# Patient Record
Sex: FEMALE | Race: WHITE | Employment: PART TIME | URBAN - METROPOLITAN AREA
[De-identification: names, ages, dates, MRNs, and addresses within clinical notes are randomized per-mention and may not be internally consistent; named-entity substitution may affect disease eponyms.]

---

## 2018-05-27 ENCOUNTER — APPOINTMENT (OUTPATIENT)
Dept: GENERAL RADIOLOGY | Age: 68
End: 2018-05-27
Attending: EMERGENCY MEDICINE
Payer: MEDICARE

## 2018-05-27 ENCOUNTER — HOSPITAL ENCOUNTER (EMERGENCY)
Age: 68
Discharge: HOME | End: 2018-05-27
Attending: EMERGENCY MEDICINE
Payer: MEDICARE

## 2018-05-27 VITALS
TEMPERATURE: 98 F | HEIGHT: 60 IN | SYSTOLIC BLOOD PRESSURE: 144 MMHG | WEIGHT: 176 LBS | BODY MASS INDEX: 34.55 KG/M2 | DIASTOLIC BLOOD PRESSURE: 84 MMHG | RESPIRATION RATE: 20 BRPM | OXYGEN SATURATION: 100 % | HEART RATE: 89 BPM

## 2018-05-27 DIAGNOSIS — E83.42 HYPOMAGNESEMIA: ICD-10-CM

## 2018-05-27 DIAGNOSIS — M79.604 BILATERAL LEG PAIN: Primary | ICD-10-CM

## 2018-05-27 DIAGNOSIS — M79.605 BILATERAL LEG PAIN: Primary | ICD-10-CM

## 2018-05-27 LAB
ALBUMIN SERPL-MCNC: 3.7 G/DL (ref 3.4–5)
ALBUMIN/GLOB SERPL: 1.2 {RATIO} (ref 0.8–1.7)
ALP SERPL-CCNC: 80 U/L (ref 45–117)
ALT SERPL-CCNC: 35 U/L (ref 13–56)
ANION GAP SERPL CALC-SCNC: 13 MMOL/L (ref 3–18)
APPEARANCE UR: CLEAR
AST SERPL-CCNC: 20 U/L (ref 15–37)
BACTERIA URNS QL MICRO: ABNORMAL /HPF
BASOPHILS # BLD: 0 K/UL (ref 0–0.06)
BASOPHILS NFR BLD: 0 % (ref 0–2)
BILIRUB SERPL-MCNC: 0.4 MG/DL (ref 0.2–1)
BILIRUB UR QL: NEGATIVE
BUN SERPL-MCNC: 32 MG/DL (ref 7–18)
BUN/CREAT SERPL: 22 (ref 12–20)
CALCIUM SERPL-MCNC: 8.8 MG/DL (ref 8.5–10.1)
CHLORIDE SERPL-SCNC: 104 MMOL/L (ref 100–108)
CK MB CFR SERPL CALC: 0.8 % (ref 0–4)
CK MB SERPL-MCNC: 2 NG/ML (ref 5–25)
CK SERPL-CCNC: 260 U/L (ref 26–192)
CO2 SERPL-SCNC: 23 MMOL/L (ref 21–32)
COLOR UR: YELLOW
CREAT SERPL-MCNC: 1.43 MG/DL (ref 0.6–1.3)
D DIMER PPP FEU-MCNC: 0.36 UG/ML(FEU)
DIFFERENTIAL METHOD BLD: ABNORMAL
EOSINOPHIL # BLD: 0.3 K/UL (ref 0–0.4)
EOSINOPHIL NFR BLD: 4 % (ref 0–5)
EPITH CASTS URNS QL MICRO: ABNORMAL /LPF (ref 0–5)
ERYTHROCYTE [DISTWIDTH] IN BLOOD BY AUTOMATED COUNT: 13.5 % (ref 11.6–14.5)
GLOBULIN SER CALC-MCNC: 3 G/DL (ref 2–4)
GLUCOSE SERPL-MCNC: 194 MG/DL (ref 74–99)
GLUCOSE UR STRIP.AUTO-MCNC: NEGATIVE MG/DL
HCT VFR BLD AUTO: 34.4 % (ref 35–45)
HGB BLD-MCNC: 11.5 G/DL (ref 12–16)
HGB UR QL STRIP: NEGATIVE
KETONES UR QL STRIP.AUTO: NEGATIVE MG/DL
LEUKOCYTE ESTERASE UR QL STRIP.AUTO: ABNORMAL
LYMPHOCYTES # BLD: 2.5 K/UL (ref 0.9–3.6)
LYMPHOCYTES NFR BLD: 33 % (ref 21–52)
MAGNESIUM SERPL-MCNC: 1 MG/DL (ref 1.6–2.6)
MCH RBC QN AUTO: 30.2 PG (ref 24–34)
MCHC RBC AUTO-ENTMCNC: 33.4 G/DL (ref 31–37)
MCV RBC AUTO: 90.3 FL (ref 74–97)
MONOCYTES # BLD: 0.4 K/UL (ref 0.05–1.2)
MONOCYTES NFR BLD: 5 % (ref 3–10)
NEUTS SEG # BLD: 4.4 K/UL (ref 1.8–8)
NEUTS SEG NFR BLD: 58 % (ref 40–73)
NITRITE UR QL STRIP.AUTO: NEGATIVE
PH UR STRIP: 5 [PH] (ref 5–8)
PLATELET # BLD AUTO: 220 K/UL (ref 135–420)
PMV BLD AUTO: 9.4 FL (ref 9.2–11.8)
POTASSIUM SERPL-SCNC: 3.8 MMOL/L (ref 3.5–5.5)
PROT SERPL-MCNC: 6.7 G/DL (ref 6.4–8.2)
PROT UR STRIP-MCNC: NEGATIVE MG/DL
RBC # BLD AUTO: 3.81 M/UL (ref 4.2–5.3)
RBC #/AREA URNS HPF: NEGATIVE /HPF (ref 0–5)
SODIUM SERPL-SCNC: 140 MMOL/L (ref 136–145)
SP GR UR REFRACTOMETRY: 1.02 (ref 1–1.03)
TROPONIN I SERPL-MCNC: <0.02 NG/ML (ref 0–0.06)
UROBILINOGEN UR QL STRIP.AUTO: 0.2 EU/DL (ref 0.2–1)
WBC # BLD AUTO: 7.5 K/UL (ref 4.6–13.2)
WBC URNS QL MICRO: ABNORMAL /HPF (ref 0–5)

## 2018-05-27 PROCEDURE — 82553 CREATINE MB FRACTION: CPT | Performed by: EMERGENCY MEDICINE

## 2018-05-27 PROCEDURE — 71045 X-RAY EXAM CHEST 1 VIEW: CPT

## 2018-05-27 PROCEDURE — 74011250636 HC RX REV CODE- 250/636: Performed by: EMERGENCY MEDICINE

## 2018-05-27 PROCEDURE — 93005 ELECTROCARDIOGRAM TRACING: CPT

## 2018-05-27 PROCEDURE — 93970 EXTREMITY STUDY: CPT

## 2018-05-27 PROCEDURE — 85379 FIBRIN DEGRADATION QUANT: CPT | Performed by: EMERGENCY MEDICINE

## 2018-05-27 PROCEDURE — 99285 EMERGENCY DEPT VISIT HI MDM: CPT

## 2018-05-27 PROCEDURE — 80053 COMPREHEN METABOLIC PANEL: CPT | Performed by: EMERGENCY MEDICINE

## 2018-05-27 PROCEDURE — 96365 THER/PROPH/DIAG IV INF INIT: CPT

## 2018-05-27 PROCEDURE — 81001 URINALYSIS AUTO W/SCOPE: CPT | Performed by: EMERGENCY MEDICINE

## 2018-05-27 PROCEDURE — 83735 ASSAY OF MAGNESIUM: CPT | Performed by: EMERGENCY MEDICINE

## 2018-05-27 PROCEDURE — 85025 COMPLETE CBC W/AUTO DIFF WBC: CPT | Performed by: EMERGENCY MEDICINE

## 2018-05-27 PROCEDURE — 94762 N-INVAS EAR/PLS OXIMTRY CONT: CPT

## 2018-05-27 RX ORDER — MAGNESIUM SULFATE HEPTAHYDRATE 40 MG/ML
2 INJECTION, SOLUTION INTRAVENOUS ONCE
Status: COMPLETED | OUTPATIENT
Start: 2018-05-27 | End: 2018-05-27

## 2018-05-27 RX ORDER — LANOLIN ALCOHOL/MO/W.PET/CERES
400 CREAM (GRAM) TOPICAL 2 TIMES DAILY
Qty: 14 TAB | Refills: 0 | Status: SHIPPED | OUTPATIENT
Start: 2018-05-27

## 2018-05-27 RX ADMIN — MAGNESIUM SULFATE IN WATER 2 G: 40 INJECTION, SOLUTION INTRAVENOUS at 16:10

## 2018-05-27 RX ADMIN — SODIUM CHLORIDE 1000 ML: 900 INJECTION, SOLUTION INTRAVENOUS at 16:41

## 2018-05-27 NOTE — PROCEDURES
[facility]  *** FINAL REPORT ***    Name: Bhavana Carmen  MRN: [id number]  : 1950  HIS Order #: [id number]  [id number] [facility] Visit #: [id number]  Date: 27 May 2018    TYPE OF TEST: Peripheral Venous Testing    REASON FOR TEST  Pain in limb, Limb swelling    Right Leg:-  Deep venous thrombosis:           No  Superficial venous thrombosis:    No  Deep venous insufficiency:        Not examined  Superficial venous insufficiency: Not examined    Left Leg:-  Deep venous thrombosis:           No  Superficial venous thrombosis:    No  Deep venous insufficiency:        Not examined  Superficial venous insufficiency: Not examined      INTERPRETATION/FINDINGS  Duplex images were obtained using 2-D gray scale, color flow, and  spectral Doppler analysis. Right leg :  1. Deep veins visualized include the common femoral, deep femoral,  proximal femoral, mid femoral, distal femoral, popliteal(above knee),  popliteal(fossa), popliteal(below knee), posterior tibial and peroneal   veins. 2. No evidence of deep venous thrombosis detected in the veins  visualized. 3. Superficial veins visualized include the proximal great saphenous  vein. 4. No evidence of superficial thrombosis detected. Left leg :  1. Deep veins visualized include the common femoral, deep femoral,  proximal femoral, mid femoral, distal femoral, popliteal(above knee),  popliteal(fossa), popliteal(below knee), posterior tibial and peroneal   veins. 2. No evidence of deep venous thrombosis detected in the veins  visualized. 3. Superficial veins visualized include the proximal great saphenous  vein. 4. No evidence of superficial thrombosis detected. ADDITIONAL COMMENTS    I have personally reviewed the data relevant to the interpretation of  this  study. TECHNOLOGIST: Manish Forrest  Signed: 2018 04:03 PM    PHYSICIAN: Lázaro Orourke.  Mary Mcduffie MD  Signed: 2018 08:54 AM

## 2018-05-27 NOTE — ED TRIAGE NOTES
C/o feeling \"sick\" and \"funny\" for one month, states sx's worse with pollen. States she has been seen by her doctor for same, given prednisone with no relief, then started feeling worse and felt weak, states she almost \"passed out\" and was seen in the ED at [facility]--dx'd with asthma and bronchitis, given inhaler and another round of prednisone but states she didn't finish the prednisone because it was making her blood sugar up, no better went back to her doctor who then ordered a CT scan of her head and dx'd with sinusitis--given levaquin which she is still taking and states she now has bilateral lower leg pain since last week. Pt appears anxious, states she recently lost her  and she lost a son 14 yrs ago.

## 2018-05-27 NOTE — ED PROVIDER NOTES
EMERGENCY DEPARTMENT HISTORY AND PHYSICAL EXAM    Date: 5/27/2018  Patient Name: Clarisas Olivares    History of Presenting Illness     Chief Complaint   Patient presents with    Dizziness         History Provided By: Patient    Chief Complaint: Bilateral posterior leg pain  Duration: 4 Days  Timing:  Acute  Location: Bilateral posterior legs  Quality: Stiffness  Severity: 10 out of 10  Modifying Factors: Pain is exacerbated with movement. Associated Symptoms: loss of taste and smell and intermittent HA    Additional History (Context):   2:42 PM  Clarissa Olivares is a 69 y.o. female with PMHX of diabetes, HTN, asthma, anxiety, and depression who presents to the emergency department C/O bilateral posterior leg pain described as stiffness (10/10) onset 4 days ago. Associated sxs include loss of taste and smell, malaise, anxiety and intermittent burning chest pain. Pain is exacerbated with movement. Pain was alleviated when she rubbed her legs with alcohol. Pt reports she has been intermittently sick since April 23, 2018. Pt saw her PCP and was placed on Prednisone BID x 5 days. States the upper portion of her body felt heavy and was seen at [facility]. States she lost her sense of taste and smell recently, had a CT showing a sinus infection and was placed on Levaquin, which has provided no alleviation. Pt has a follow up with her PCP in 2 weeks. Pt lost her son multiple years ago and lost her  7 months ago. Pt denies visual changes, numbness, weakness, HA, SOB, leg swelling, illicit drug use, EtOH use, tobacco use, and any other sxs or complaints.      PCP: Aleyda Medina MD        Past History     Past Medical History:  Past Medical History:   Diagnosis Date    Acid reflux     Anxiety     Bronchitis     Depression     Diabetes ([facility] [address]     Hypercholesteremia     Hypertension        Past Surgical History:  Past Surgical History:   Procedure Laterality Date    HX CHOLECYSTECTOMY      HX TONSILLECTOMY  HX TUBAL LIGATION         Family History:  No family history on file. Social History:  Social History   Substance Use Topics    Smoking status: Never Smoker    Smokeless tobacco: Not on file    Alcohol use No       Allergies: Allergies   Allergen Reactions    Penicillins Swelling    Sulfa (Sulfonamide Antibiotics) Swelling         Review of Systems   Review of Systems   Constitutional:        (+) Malaise   Eyes: Negative for visual disturbance. Respiratory: Negative for shortness of breath. Cardiovascular: Positive for chest pain (burning). Negative for leg swelling. Musculoskeletal: Positive for myalgias (bilateral posterior legs). Neurological: Negative for weakness, numbness and headaches. Psychiatric/Behavioral: The patient is nervous/anxious. All other systems reviewed and are negative. Physical Exam     Vitals:    05/27/18 1656 05/27/18 1657 05/27/18 1700 05/27/18 1730   BP: 133/76 121/74 101/74 132/76   Pulse: 98 (!) 102 100 93   Resp:   19 17   Temp:       SpO2:   98%    Weight:       Height:         Physical Exam   Nursing note and vitals reviewed.   Constitutional: Well appearing, mild distress  Head: Normocephalic, Atraumatic  Eyes: Pupils are equal, round, and reactive to light, EOMI  Neck: Supple, non-tender  Cardiovascular: Regular rate and tachycardic, no murmurs, rubs, or gallops  Chest: Normal work of breathing and chest excursion bilaterally  Lungs: Clear to ausculation bilaterally  Abdomen: Soft, non tender, non distended, normoactive bowel sounds  Back: No evidence of trauma or deformity  Extremities: No evidence of trauma or deformity, no LE edema, tenderness in bilateral calfs  Skin: Warm and dry, normal cap refill  Neuro: Alert and appropriate, CN intact, normal speech  Psychiatric: Anxious mood and affect    Diagnostic Study Results     Labs -     Recent Results (from the past 12 hour(s))   URINALYSIS W/ RFLX MICROSCOPIC    Collection Time: 05/27/18  2:50 PM Result Value Ref Range    Color YELLOW      Appearance CLEAR      Specific gravity 1.024 1.005 - 1.030      pH (UA) 5.0 5.0 - 8.0      Protein NEGATIVE  NEG mg/dL    Glucose NEGATIVE  NEG mg/dL    Ketone NEGATIVE  NEG mg/dL    Bilirubin NEGATIVE  NEG      Blood NEGATIVE  NEG      Urobilinogen 0.2 0.2 - 1.0 EU/dL    Nitrites NEGATIVE  NEG      Leukocyte Esterase SMALL (A) NEG     URINE MICROSCOPIC ONLY    Collection Time: 05/27/18  2:50 PM   Result Value Ref Range    WBC 2 to 4 0 - 5 /hpf    RBC NEGATIVE  0 - 5 /hpf    Epithelial cells 1+ 0 - 5 /lpf    Bacteria 1+ (A) NEG /hpf   CARDIAC PANEL,(CK, CKMB & TROPONIN)    Collection Time: 05/27/18  3:00 PM   Result Value Ref Range     (H) 26 - 192 U/L    CK - MB 2.0 <3.6 ng/ml    CK-MB Index 0.8 0.0 - 4.0 %    Troponin-I, Qt. <0.02 0.00 - 0.06 NG/ML   CBC WITH AUTOMATED DIFF    Collection Time: 05/27/18  3:00 PM   Result Value Ref Range    WBC 7.5 4.6 - 13.2 K/uL    RBC 3.81 (L) 4.20 - 5.30 M/uL    HGB 11.5 (L) 12.0 - 16.0 g/dL    HCT 34.4 (L) 35.0 - 45.0 %    MCV 90.3 74.0 - 97.0 FL    MCH 30.2 24.0 - 34.0 PG    MCHC 33.4 31.0 - 37.0 g/dL    RDW 13.5 11.6 - 14.5 %    PLATELET 413 853 - 366 K/uL    MPV 9.4 9.2 - 11.8 FL    NEUTROPHILS 58 40 - 73 %    LYMPHOCYTES 33 21 - 52 %    MONOCYTES 5 3 - 10 %    EOSINOPHILS 4 0 - 5 %    BASOPHILS 0 0 - 2 %    ABS. NEUTROPHILS 4.4 1.8 - 8.0 K/UL    ABS. LYMPHOCYTES 2.5 0.9 - 3.6 K/UL    ABS. MONOCYTES 0.4 0.05 - 1.2 K/UL    ABS. EOSINOPHILS 0.3 0.0 - 0.4 K/UL    ABS.  BASOPHILS 0.0 0.0 - 0.06 K/UL    DF AUTOMATED     METABOLIC PANEL, COMPREHENSIVE    Collection Time: 05/27/18  3:00 PM   Result Value Ref Range    Sodium 140 136 - 145 mmol/L    Potassium 3.8 3.5 - 5.5 mmol/L    Chloride 104 100 - 108 mmol/L    CO2 23 21 - 32 mmol/L    Anion gap 13 3.0 - 18 mmol/L    Glucose 194 (H) 74 - 99 mg/dL    BUN 32 (H) 7.0 - 18 MG/DL    Creatinine 1.43 (H) 0.6 - 1.3 MG/DL    BUN/Creatinine ratio 22 (H) 12 - 20      GFR est AA 44 (L) >60 ml/min/1.73m2    GFR est non-AA 36 (L) >60 ml/min/1.73m2    Calcium 8.8 8.5 - 10.1 MG/DL    Bilirubin, total 0.4 0.2 - 1.0 MG/DL    ALT (SGPT) 35 13 - 56 U/L    AST (SGOT) 20 15 - 37 U/L    Alk. phosphatase 80 45 - 117 U/L    Protein, total 6.7 6.4 - 8.2 g/dL    Albumin 3.7 3.4 - 5.0 g/dL    Globulin 3.0 2.0 - 4.0 g/dL    A-G Ratio 1.2 0.8 - 1.7     MAGNESIUM    Collection Time: 05/27/18  3:00 PM   Result Value Ref Range    Magnesium 1.0 (L) 1.6 - 2.6 mg/dL   D DIMER    Collection Time: 05/27/18  3:00 PM   Result Value Ref Range    D DIMER 0.36 <0.46 ug/ml(FEU)   EKG, 12 LEAD, INITIAL    Collection Time: 05/27/18  3:03 PM   Result Value Ref Range    Ventricular Rate 112 BPM    Atrial Rate 112 BPM    P-R Interval 136 ms    QRS Duration 66 ms    Q-T Interval 306 ms    QTC Calculation (Bezet) 417 ms    Calculated P Axis 59 degrees    Calculated R Axis 33 degrees    Calculated T Axis 50 degrees    Diagnosis       Sinus tachycardia  Otherwise normal ECG  No previous ECGs available         Radiologic Studies -   DUPLEX LOWER EXT VENOUS BILAT   INTERPRETATION/FINDINGS  Duplex images were obtained using 2-D gray scale, color flow, and  spectral Doppler analysis. Right leg :  1. Deep veins visualized include the common femoral, deep femoral,  proximal femoral, mid femoral, distal femoral, popliteal(above knee),  popliteal(fossa), popliteal(below knee), posterior tibial and peroneal   veins. 2. No evidence of deep venous thrombosis detected in the veins  visualized. 3. Superficial veins visualized include the proximal great saphenous  vein. 4. No evidence of superficial thrombosis detected. Left leg :  1. Deep veins visualized include the common femoral, deep femoral,  proximal femoral, mid femoral, distal femoral, popliteal(above knee),  popliteal(fossa), popliteal(below knee), posterior tibial and peroneal   veins. 2. No evidence of deep venous thrombosis detected in the veins  visualized.   3. Superficial veins visualized include the proximal great saphenous  vein. 4. No evidence of superficial thrombosis detected. As read by the technologist.      XR CHEST PORT   Final Result   IMPRESSION:     No acute cardiopulmonary disease. As read by the radiologist.     CT Results  (Last 48 hours)    None        CXR Results  (Last 48 hours)               05/27/18 1511  XR CHEST PORT Final result    Impression:  IMPRESSION:       No acute cardiopulmonary disease. Narrative:  EXAM: Chest portable       INDICATION: Short of breath       COMPARISON: None.       _______________       FINDINGS:       AP portable chest film was performed. Lungs are clear. No effusion or   pneumothorax. Heart and pulmonary vascularity are normal.       _______________                 Medications given in the ED-  Medications   magnesium sulfate 2 g/50 ml IVPB (premix or compounded) (2 g IntraVENous New Bag 5/27/18 1610)   sodium chloride 0.9 % bolus infusion 1,000 mL (1,000 mL IntraVENous New Bag 5/27/18 1641)         Medical Decision Making   I am the first provider for this patient. I reviewed the vital signs, available nursing notes, past medical history, past surgical history, family history and social history. Vital Signs-Reviewed the patient's vital signs. Pulse Oximetry Analysis - 99% on RA     EKG interpretation: (Preliminary)  Rhythm: Sinus tachycardia. Rate: 112 bpm; QRS duration: 86 ms  EKG read by Britney Shelton MD at 3:20 PM     Records Reviewed: Nursing Notes and Old Medical Records    Procedures:  Procedures    ED Course:   2:42 PM Initial assessment performed. The patients presenting problems have been discussed, and they are in agreement with the care plan formulated and outlined with them. I have encouraged them to ask questions as they arise throughout their visit.    5:29 PM Updated pt on results and plan for discharge with magnesium supplements and need for early PCP follow up. Pt understands.     Diagnosis and Disposition     Discussion:  69 y.o female with multiple vague symptoms for 1 month. On labs, magnesium noted to be low and pt dehydrated. Pt feels better after hydration. Initiated magnesium repletion in ED. Will discharge with magox supplements, early PCP follow up for mag recheck, and return precautions. Pt understands and agrees with this plan. DISCHARGE NOTE:  5:30 PM  Jovanni Guy  results have been reviewed with her. She has been counseled regarding her diagnosis, treatment, and plan. She verbally conveys understanding and agreement of the signs, symptoms, diagnosis, treatment and prognosis and additionally agrees to follow up as discussed. She also agrees with the care-plan and conveys that all of her questions have been answered. I have also provided discharge instructions for her that include: educational information regarding their diagnosis and treatment, and list of reasons why they would want to return to the ED prior to their follow-up appointment, should her condition change. She has been provided with education for proper emergency department utilization. CLINICAL IMPRESSION:    1. Bilateral leg pain    2. Hypomagnesemia        PLAN:  1. D/C Home  2. Current Discharge Medication List      START taking these medications    Details   magnesium oxide (MAG-OX) 400 mg tablet Take 1 Tab by mouth two (2) times a day. Qty: 14 Tab, Refills: 0           3. Follow-up Information     Follow up With Details Comments Contact Info    Amrit Gilliland MD Schedule an appointment as soon as possible for a visit in 3 days For primary care follow up. [address] [place] [phone]  [phone]      THE [facility] EMERGENCY DEPT Go to As needed, If symptoms worsen [address] [id number]  [phone]        _______________________________    Attestations:   This note is prepared by Cammie Ang, acting as Scribe for Rubina Alvarez MD.    Rubina Alvarez MD:  The nataliya's documentation has been prepared under my direction and personally reviewed by me in its entirety.   I confirm that the note above accurately reflects all work, treatment, procedures, and medical decision making performed by me.  _______________________________

## 2018-05-27 NOTE — DISCHARGE INSTRUCTIONS
Leg Pain: Care Instructions  Your Care Instructions  Many things can cause leg pain. Too much exercise or overuse can cause a muscle cramp (or charley horse). You can get leg cramps from not eating a balanced diet that has enough potassium, calcium, and other minerals. If you do not drink enough fluids or are taking certain medicines, you may develop leg cramps. Other causes of leg pain include injuries, blood flow problems, nerve damage, and twisted and enlarged veins (varicose veins). You can usually ease pain with self-care. Your doctor may recommend that you rest your leg and keep it elevated. Follow-up care is a key part of your treatment and safety. Be sure to make and go to all appointments, and call your doctor if you are having problems. It's also a good idea to know your test results and keep a list of the medicines you take. How can you care for yourself at home? · Take pain medicines exactly as directed. ¨ If the doctor gave you a prescription medicine for pain, take it as prescribed. ¨ If you are not taking a prescription pain medicine, ask your doctor if you can take an over-the-counter medicine. · Take any other medicines exactly as prescribed. Call your doctor if you think you are having a problem with your medicine. · Rest your leg while you have pain, and avoid standing for long periods of time. · Prop up your leg at or above the level of your heart when possible. · Make sure you are eating a balanced diet that is rich in calcium, potassium, and magnesium, especially if you are pregnant. · If directed by your doctor, put ice or a cold pack on the area for 10 to 20 minutes at a time. Put a thin cloth between the ice and your skin. · Your leg may be in a splint, a brace, or an elastic bandage, and you may have crutches to help you walk. Follow your doctor's directions about how long to wear supports and how to use the crutches. When should you call for help?   Call 911 anytime you think you may need emergency care. For example, call if:  ? · You have sudden chest pain and shortness of breath, or you cough up blood. ? · Your leg is cool or pale or changes color. ?Call your doctor now or seek immediate medical care if:  ? · You have increasing or severe pain. ? · Your leg suddenly feels weak and you cannot move it. ? · You have signs of a blood clot, such as:  ¨ Pain in your calf, back of the knee, thigh, or groin. ¨ Redness and swelling in your leg or groin. ? · You have signs of infection, such as:  ¨ Increased pain, swelling, warmth, or redness. ¨ Red streaks leading from the sore area. ¨ Pus draining from a place on your leg. ¨ A fever. ? · You cannot bear weight on your leg. ? Watch closely for changes in your health, and be sure to contact your doctor if:  ? · You do not get better as expected. Where can you learn more? Go to http://jackson-tanja.info/. Enter Z007 in the search box to learn more about \"Leg Pain: Care Instructions. \"  Current as of: March 20, 2017  Content Version: 11.4  © 0569-2348 [employer]. Care instructions adapted under license by [employer] (which disclaims liability or warranty for this information). If you have questions about a medical condition or this instruction, always ask your healthcare professional. [place] [address] any warranty or liability for your use of this information.

## 2018-06-09 LAB
ATRIAL RATE: 112 BPM
CALCULATED P AXIS, ECG09: 59 DEGREES
CALCULATED R AXIS, ECG10: 33 DEGREES
CALCULATED T AXIS, ECG11: 50 DEGREES
DIAGNOSIS, 93000: NORMAL
P-R INTERVAL, ECG05: 136 MS
Q-T INTERVAL, ECG07: 306 MS
QRS DURATION, ECG06: 66 MS
QTC CALCULATION (BEZET), ECG08: 417 MS
VENTRICULAR RATE, ECG03: 112 BPM

## 2024-07-04 ENCOUNTER — HOSPITAL ENCOUNTER (EMERGENCY)
Facility: HOSPITAL | Age: 74
Discharge: HOME | End: 2024-07-04
Attending: EMERGENCY MEDICINE
Payer: MEDICARE

## 2024-07-04 VITALS
OXYGEN SATURATION: 95 % | HEART RATE: 70 BPM | RESPIRATION RATE: 18 BRPM | TEMPERATURE: 98 F | HEIGHT: 60 IN | DIASTOLIC BLOOD PRESSURE: 80 MMHG | WEIGHT: 180 LBS | SYSTOLIC BLOOD PRESSURE: 141 MMHG | BODY MASS INDEX: 35.34 KG/M2

## 2024-07-04 DIAGNOSIS — M54.2 NECK PAIN: Primary | ICD-10-CM

## 2024-07-04 PROCEDURE — 99283 EMERGENCY DEPT VISIT LOW MDM: CPT

## 2024-07-04 RX ORDER — CYCLOBENZAPRINE HCL 10 MG
10 TABLET ORAL 3 TIMES DAILY PRN
Qty: 21 TABLET | Refills: 0 | Status: SHIPPED | OUTPATIENT
Start: 2024-07-04 | End: 2024-07-14

## 2024-07-04 ASSESSMENT — PAIN - FUNCTIONAL ASSESSMENT
PAIN_FUNCTIONAL_ASSESSMENT: ACTIVITIES ARE NOT PREVENTED
PAIN_FUNCTIONAL_ASSESSMENT: 0-10

## 2024-07-04 ASSESSMENT — PAIN DESCRIPTION - ORIENTATION: ORIENTATION: RIGHT;POSTERIOR

## 2024-07-04 ASSESSMENT — PAIN SCALES - GENERAL: PAINLEVEL_OUTOF10: 9

## 2024-07-04 ASSESSMENT — PAIN DESCRIPTION - LOCATION: LOCATION: NECK

## 2024-07-04 ASSESSMENT — PAIN DESCRIPTION - PAIN TYPE: TYPE: ACUTE PAIN

## 2024-07-04 ASSESSMENT — PAIN DESCRIPTION - DESCRIPTORS: DESCRIPTORS: THROBBING

## 2024-07-04 NOTE — DISCHARGE INSTRUCTIONS
Take the muscle relaxers as directed.  Do your best not to scratch the scab on your neck as this will prevent the wound from healing.  Until your neck pain is getting better also recommend you try to sleep on your back as best as you can.  In addition to taking Tylenol you can also take 600 mg of Motrin every 8 hours

## 2024-07-04 NOTE — ED TRIAGE NOTES
Pt to ED ambulatory c/o neck pain/stiffness. Pt reports having a bump on back of her neck that's causing the pain and stiffness.

## 2024-07-04 NOTE — ED PROVIDER NOTES
[facility] EMERGENCY DEPT  EMERGENCY DEPARTMENT ENCOUNTER      Pt Name: Marion Raphael  MRN: [id number]  Birthdate 1950  Date of evaluation: 7/4/2024  Provider: Brendan Baird MD      HISTORY OF PRESENT ILLNESS      [facility]  74-year-old female presenting with a wound on her nape as well as right-sided neck pain.  She says symptoms started around the same time.  Patient was using some curlers when getting her hair done and she thinks that she may have gotten scratched by this.  The next day she woke up with pain on the right side of her neck radiating to her right trapezius muscle.  She says her neck feels stiff.  She does sleep on her right side and has been continuing to do so.  Pain is made worse with movement.  No drainage from the wound.  No fevers or chills.  No dizziness or visual changes.  No numbness or weakness in the extremities.  Denies falls or trauma.      Nursing Notes were reviewed.    REVIEW OF SYSTEMS         Review of Systems  All systems reviewed were negative less otherwise document in the HPI      PAST MEDICAL HISTORY   No past medical history on file.      SURGICAL HISTORY     No past surgical history on file.      CURRENT MEDICATIONS       Previous Medications    No medications on file       ALLERGIES     Penicillins and Sulfa antibiotics    FAMILY HISTORY     No family history on file.       SOCIAL HISTORY       Social History     Socioeconomic History    Marital status:          PHYSICAL EXAM       ED Triage Vitals [07/04/24 0606]   BP Temp Temp Source Pulse Respirations SpO2 Height Weight - Scale   (!) 141/80 98 °F (36.7 °C) Oral 70 18 95 % 1.524 m (5') 81.6 kg (180 lb)       Body mass index is 35.15 kg/m².    Physical Exam  Constitutional:       Comments: Sitting upright in no acute distress   Eyes:      General: No scleral icterus.     Extraocular Movements: Extraocular movements intact.   Neck:      Comments: Normal range of motion.  No cervical spinal tenderness.  No carotid